# Patient Record
Sex: FEMALE | Race: WHITE | NOT HISPANIC OR LATINO | Employment: FULL TIME | URBAN - METROPOLITAN AREA
[De-identification: names, ages, dates, MRNs, and addresses within clinical notes are randomized per-mention and may not be internally consistent; named-entity substitution may affect disease eponyms.]

---

## 2019-05-16 PROBLEM — R87.612 LGSIL ON PAP SMEAR OF CERVIX: Status: ACTIVE | Noted: 2019-05-16

## 2019-05-16 PROBLEM — Z98.890 STATUS POST COLPOSCOPY: Status: ACTIVE | Noted: 2019-05-16

## 2019-05-20 LAB — SURGICAL PATHOLOGY: NORMAL

## 2019-05-22 PROBLEM — K31.7 GASTRIC POLYP: Status: ACTIVE | Noted: 2019-02-14

## 2019-06-03 PROBLEM — H53.8 BLURRED VISION, BILATERAL: Status: ACTIVE | Noted: 2019-06-03

## 2019-06-03 PROBLEM — E11.9 DIABETES MELLITUS WITHOUT COMPLICATION: Status: ACTIVE | Noted: 2019-06-03

## 2019-06-26 PROBLEM — R10.9 ABDOMINAL PAIN: Status: ACTIVE | Noted: 2019-06-26

## 2019-06-26 PROBLEM — E11.65 TYPE 2 DIABETES MELLITUS WITH HYPERGLYCEMIA, WITHOUT LONG-TERM CURRENT USE OF INSULIN: Chronic | Status: ACTIVE | Noted: 2019-06-26

## 2020-03-30 ENCOUNTER — OFFICE VISIT (OUTPATIENT)
Dept: SURGERY | Facility: CLINIC | Age: 30
End: 2020-03-30
Payer: MEDICAID

## 2020-03-30 VITALS — BODY MASS INDEX: 50.02 KG/M2 | HEIGHT: 64 IN | WEIGHT: 293 LBS

## 2020-03-30 DIAGNOSIS — K82.9 GALLBLADDER DISEASE: Primary | ICD-10-CM

## 2020-03-30 PROCEDURE — 99203 OFFICE O/P NEW LOW 30 MIN: CPT | Mod: S$GLB,,, | Performed by: SURGERY

## 2020-03-30 PROCEDURE — 99203 PR OFFICE/OUTPT VISIT, NEW, LEVL III, 30-44 MIN: ICD-10-PCS | Mod: S$GLB,,, | Performed by: SURGERY

## 2020-03-30 RX ORDER — HYDROCODONE BITARTRATE AND ACETAMINOPHEN 7.5; 325 MG/1; MG/1
TABLET ORAL
COMMUNITY
Start: 2020-03-27 | End: 2020-07-01 | Stop reason: SDUPTHER

## 2020-03-30 NOTE — PROGRESS NOTES
History & Physical    SUBJECTIVE:     History of Present Illness:    29-year-old female presents with right upper quadrant pain and right back pain over the past year with vomiting.  She apparently has seen a gastroenterologist and has had endoscopy and upper GI studies which I do not have results for.  She does have a history of GERD as well as gastritis.  She recently went to the emergency room at [facility] CT scan was done which showed a normal gallbladder and fatty liver.  Patient denies fever, chills, or pancreatitis however on her lab work her lipase was mildly elevated at 166.  Gallbladder ultrasound approximately a year and a half ago and she report which she says was normal.  HIDA scan was done which showed 91% ejection fraction which is an abnormal hyper contractility.    Chief Complaint   Patient presents with    Gall Bladder Problem         Review of patient's allergies indicates:  Review of patient's allergies indicates:   Allergen Reactions    Cherry flavor        Current Outpatient Medications on File Prior to Visit   Medication Sig Dispense Refill    ascorbic acid, vitamin C, (VITAMIN C) 100 MG tablet Take 100 mg by mouth once daily.      albuterol (VENTOLIN HFA) 90 mcg/actuation inhaler Inhale 2 puffs into the lungs every 6 (six) hours as needed for Wheezing. Rescue 18 g 11    atorvastatin (LIPITOR) 40 MG tablet Take 1 tablet (40 mg total) by mouth once daily. 90 tablet 3    cetirizine (ZYRTEC) 10 MG tablet Take 1 tablet (10 mg total) by mouth once daily. 30 tablet 1    cyclobenzaprine (FLEXERIL) 10 MG tablet Take 5 mg by mouth.       escitalopram oxalate (LEXAPRO) 10 MG tablet Take 1 tablet (10 mg total) by mouth once daily. 30 tablet 11    fluticasone propionate (FLONASE) 50 mcg/actuation nasal spray       HYDROcodone-acetaminophen (NORCO) 7.5-325 mg per tablet       lancets (TRUEPLUS LANCETS) 33 gauge Misc 1 lancet by Other route 2 (two) times daily. 180 each 3     levothyroxine (SYNTHROID) 100 MCG tablet Take 400 mcg by mouth.      levothyroxine (SYNTHROID) 300 MCG Tab       losartan-hydrochlorothiazide 100-25 mg (HYZAAR) 100-25 mg per tablet Take 1 tablet by mouth once daily. 90 tablet 3    metFORMIN (GLUCOPHAGE-XR) 500 MG 24 hr tablet Take 1 tablet (500 mg total) by mouth daily with breakfast. 90 tablet 3    montelukast (SINGULAIR) 10 mg tablet Take 10 mg by mouth.      ondansetron (ZOFRAN-ODT) 8 MG TbDL   0    pantoprazole (PROTONIX) 40 MG tablet Take 40 mg by mouth.      prochlorperazine (COMPAZINE) 5 MG tablet TK 1 TO 2 TS PO Q 6 TO 8 H PRN NV  0    [DISCONTINUED] blood-glucose meter kit Use as instructed 1 each 0    [DISCONTINUED] drospirenone-ethinyl estradiol (TRESA) 3-0.03 mg per tablet Take 1 tablet by mouth.      [DISCONTINUED] fenofibrate 160 MG Tab Take 160 mg by mouth.       No current facility-administered medications on file prior to visit.        Past Medical History:   Diagnosis Date    Amenorrhea     Diabetes mellitus     Gastritis     GERD with esophagitis     HLD (hyperlipidemia)     HTN, goal below 130/80     Hypothyroidism     NAFLD (nonalcoholic fatty liver disease)     PCOS (polycystic ovarian syndrome)     Prediabetes     Thyroid cancer      Past Surgical History:   Procedure Laterality Date    TONSILLECTOMY  2008    TOTAL THYROIDECTOMY  2011    URETERAL STENT PLACEMENT Left 04/2013    Removed 04/13     Family History   Problem Relation Age of Onset    Hypertension Mother     Stomach cancer Father     Esophageal cancer Father     Schizophrenia Father     Coronary artery disease Maternal Grandmother     Peripheral vascular disease Maternal Grandmother        Social History     Socioeconomic History    Marital status: Single     Spouse name: Not on file    Number of children: Not on file    Years of education: Not on file    Highest education level: Not on file   Occupational History    Not on file   Social Needs     Financial resource strain: Not on file    Food insecurity:     Worry: Not on file     Inability: Not on file    Transportation needs:     Medical: Not on file     Non-medical: Not on file   Tobacco Use    Smoking status: Never Smoker    Smokeless tobacco: Never Used   Substance and Sexual Activity    Alcohol use: Yes     Frequency: Monthly or less     Drinks per session: 1 or 2    Drug use: Yes     Types: Marijuana    Sexual activity: Yes     Partners: Male     Birth control/protection: OCP   Lifestyle    Physical activity:     Days per week: Not on file     Minutes per session: Not on file    Stress: Not on file   Relationships    Social connections:     Talks on phone: Not on file     Gets together: Not on file     Attends Mandaen service: Not on file     Active member of club or organization: Not on file     Attends meetings of clubs or organizations: Not on file     Relationship status: Not on file   Other Topics Concern    Not on file   Social History Narrative    Not on file          Review of Systems   Constitutional: Negative for chills and fever.   Respiratory: Positive for cough and sputum production.    Cardiovascular: Positive for chest pain.   Gastrointestinal: Negative for abdominal pain, heartburn, nausea and vomiting.   Genitourinary: Positive for dysuria and frequency.   Neurological: Positive for dizziness, seizures and weakness.   Endo/Heme/Allergies: Bruises/bleeds easily.       OBJECTIVE:     There were no vitals filed for this visit.              Physical Exam:  Physical Exam   Constitutional: She is oriented to person, place, and time and well-developed, well-nourished, and in no distress.   Morbidly obese   HENT:   Head: Normocephalic and atraumatic.   Eyes: Pupils are equal, round, and reactive to light. EOM are normal.   Neck: Normal range of motion. Neck supple.   Cardiovascular: Normal rate.   Pulmonary/Chest: Effort normal and breath sounds normal.   Abdominal: Soft. Bowel  sounds are normal. She exhibits no distension. There is no tenderness.   Musculoskeletal: Normal range of motion. She exhibits no edema.   Neurological: She is alert and oriented to person, place, and time. She has normal reflexes.   Skin: Skin is warm and dry.   Psychiatric: Affect and judgment normal.           ASSESSMENT/PLAN:   Right upper quadrant pain, possible gallbladder disease with hyper contractility  PLAN:  Repeat gallbladder ultrasound I think is indicated to see if she has gallstones.  I discussed laparoscopic cholecystectomy being done for hypocontractility disorders of the gallbladder and that this may or may not help her.  She wishes to proceed with surgery when elective cases can be scheduled

## 2020-04-28 DIAGNOSIS — K82.9 GALLBLADDER DISEASE: Primary | ICD-10-CM

## 2020-04-30 LAB
ALBUMIN SERPL BCP-MCNC: 3.7 G/DL (ref 3.4–5)
ALBUMIN/GLOBULIN RATIO: 0.95 RATIO
ALP SERPL-CCNC: 113 U/L (ref 45–117)
ALT SERPL W P-5'-P-CCNC: 66 U/L (ref 13–56)
AST SERPL-CCNC: 49 U/L (ref 15–37)
B-HCG SERPL-ACNC: < 1 MIU/ML
BILIRUB CONJ+UNCONJ SERPL-MCNC: 0.4 MG/DL
BILIRUB SERPL-MCNC: 0.5 MG/DL (ref 0.2–1)
BILIRUBIN DIRECT+TOT PNL SERPL-MCNC: 0.1 MG/DL (ref 0–0.2)
BUN SERPL-MCNC: 18 MG/DL (ref 7–18)
BUN/CREAT SERPL: 17.14 RATIO
CALCIUM BLD-MCNC: NORMAL MG/DL
CALCIUM SERPL-MCNC: 9 MG/DL (ref 8.5–10.1)
CHLORIDE SERPL-SCNC: 103 MMOL/L (ref 98–107)
CO2 SERPL-SCNC: 30 MMOL/L (ref 21–32)
COVID-19 AB, IGM: NORMAL
CREAT SERPL-MCNC: 1.05 MG/DL (ref 0.55–1.02)
ERYTHROCYTE [DISTWIDTH] IN BLOOD BY AUTOMATED COUNT: 14.6 % (ref 0–15.5)
GFR ESTIMATION: > 60
GLOBULIN: 3.9 G/DL
GLUCOSE SERPL-MCNC: 303 MG/DL (ref 74–106)
HCT VFR BLD AUTO: 40.7 % (ref 37–47)
HGB BLD-MCNC: 13.8 G/DL (ref 12–16)
MANUAL NRBC PER 100 CELLS: 0 % (ref 0–0.2)
MCH RBC QN AUTO: 30.9 PG (ref 27–32)
MCHC RBC AUTO-ENTMCNC: 33.9 % (ref 32–36)
MCV RBC AUTO: 91.1 FL (ref 80–99)
PLATELETS: 200 10*3/UL (ref 130–400)
POTASSIUM SERPL-SCNC: 3.9 MMOL/L (ref 3.5–5.1)
PROT SERPL-MCNC: 7.6 G/DL (ref 6.4–8.2)
RBC # BLD AUTO: 4.47 10*6/UL (ref 4.2–5.4)
SODIUM BLD-SCNC: 139 MMOL/L (ref 131–143)
WBC # BLD: 9.6 10*3/UL (ref 4.5–10)

## 2020-05-07 ENCOUNTER — OUTSIDE PLACE OF SERVICE (OUTPATIENT)
Dept: ADMINISTRATIVE | Facility: OTHER | Age: 30
End: 2020-05-07
Payer: MEDICAID

## 2020-05-07 LAB
GLUCOSE SERPL-MCNC: 178 MG/DL (ref 70–105)
GLUCOSE SERPL-MCNC: 208 MG/DL (ref 70–105)

## 2020-05-07 PROCEDURE — 47562 PR LAP,CHOLECYSTECTOMY: ICD-10-PCS | Mod: ,,, | Performed by: SURGERY

## 2020-05-07 PROCEDURE — 47562 LAPAROSCOPIC CHOLECYSTECTOMY: CPT | Mod: ,,, | Performed by: SURGERY

## 2020-05-13 ENCOUNTER — OFFICE VISIT (OUTPATIENT)
Dept: SURGERY | Facility: CLINIC | Age: 30
End: 2020-05-13
Payer: MEDICAID

## 2020-05-13 DIAGNOSIS — Z98.890 POST-OPERATIVE STATE: Primary | ICD-10-CM

## 2020-05-13 PROCEDURE — 99024 POSTOP FOLLOW-UP VISIT: CPT | Mod: S$GLB,,, | Performed by: SURGERY

## 2020-05-13 PROCEDURE — 99024 PR POST-OP FOLLOW-UP VISIT: ICD-10-PCS | Mod: S$GLB,,, | Performed by: SURGERY

## 2020-05-13 NOTE — PROGRESS NOTES
HPI:  Postop revisit status post laparoscopic cholecystectomy.  Patient complains of incisional soreness.  No nausea or vomiting.  Path report shows cholesterol [place] with chronic cholecystitis    PHYSICAL EXAM:  Incisions healing well.  Abdomen obese but soft with mild incisional tenderness.  Subcuticular suture removed from epigastric port site  ASSESSMENT:    Stable postop lap choly  PLAN:    Limitations discussed with patient.  Revisit as needed

## 2020-06-16 PROBLEM — R11.2 NON-INTRACTABLE VOMITING WITH NAUSEA: Status: ACTIVE | Noted: 2020-06-16

## 2020-06-16 PROBLEM — R80.9 POSITIVE FOR MACROALBUMINURIA: Status: ACTIVE | Noted: 2020-06-16

## 2020-06-16 PROBLEM — R29.818 SUSPECTED SLEEP APNEA: Status: ACTIVE | Noted: 2020-06-16

## 2020-06-16 PROBLEM — Z87.42 HISTORY OF OVARIAN CYST: Status: ACTIVE | Noted: 2020-06-16

## 2020-06-16 PROBLEM — Z87.42 HISTORY OF PCOS: Status: ACTIVE | Noted: 2020-06-16

## 2020-06-16 PROBLEM — R87.612 LOW GRADE SQUAMOUS INTRAEPITH LESION ON CYTOLOGIC SMEAR CERVIX (LGSIL): Status: ACTIVE | Noted: 2020-06-16

## 2020-07-02 PROBLEM — M62.830 BACK SPASM: Status: ACTIVE | Noted: 2020-07-02

## 2020-07-17 PROBLEM — R11.10 NON-INTRACTABLE VOMITING: Status: ACTIVE | Noted: 2018-08-22

## 2020-07-17 PROBLEM — R10.30 LOWER ABDOMINAL PAIN: Status: ACTIVE | Noted: 2018-12-20

## 2021-02-10 ENCOUNTER — HISTORICAL (OUTPATIENT)
Dept: ADMINISTRATIVE | Facility: HOSPITAL | Age: 31
End: 2021-02-10

## 2021-03-01 LAB
BILIRUB SERPL-MCNC: NEGATIVE MG/DL
BLOOD URINE, POC: NORMAL
CLARITY, POC UA: NORMAL
COLOR, POC UA: YELLOW
GLUCOSE UR QL STRIP: NORMAL
KETONES UR QL STRIP: NEGATIVE
LEUKOCYTE EST, POC UA: NORMAL
NITRITE, POC UA: NEGATIVE
PH, POC UA: 5.5
PROTEIN, POC: NORMAL
SARS-COV-2 RNA RESP QL NAA+PROBE: NEGATIVE
SPECIFIC GRAVITY, POC UA: 1.02
UROBILINOGEN, POC UA: NORMAL

## 2021-03-12 ENCOUNTER — HISTORICAL (OUTPATIENT)
Dept: ADMINISTRATIVE | Facility: HOSPITAL | Age: 31
End: 2021-03-12

## 2021-03-12 LAB — SARS-COV-2 RNA RESP QL NAA+PROBE: NOT DETECTED

## 2021-05-11 ENCOUNTER — HISTORICAL (OUTPATIENT)
Dept: ADMINISTRATIVE | Facility: HOSPITAL | Age: 31
End: 2021-05-11

## 2021-06-17 ENCOUNTER — HISTORICAL (OUTPATIENT)
Dept: ADMINISTRATIVE | Facility: HOSPITAL | Age: 31
End: 2021-06-17

## 2021-06-17 LAB — SARS-COV-2 RNA RESP QL NAA+PROBE: NEGATIVE

## 2021-06-19 LAB — FINAL CULTURE: NORMAL

## 2021-12-06 ENCOUNTER — PATIENT MESSAGE (OUTPATIENT)
Dept: RESEARCH | Facility: HOSPITAL | Age: 31
End: 2021-12-06
Payer: MEDICAID

## 2021-12-11 PROBLEM — Z79.4 TYPE 2 DIABETES MELLITUS WITH HYPERGLYCEMIA, WITH LONG-TERM CURRENT USE OF INSULIN: Status: ACTIVE | Noted: 2019-06-26

## 2021-12-11 PROBLEM — E11.65 TYPE 2 DIABETES MELLITUS WITH HYPERGLYCEMIA, WITH LONG-TERM CURRENT USE OF INSULIN: Status: ACTIVE | Noted: 2019-06-26

## 2022-04-11 ENCOUNTER — HISTORICAL (OUTPATIENT)
Dept: ADMINISTRATIVE | Facility: HOSPITAL | Age: 32
End: 2022-04-11
Payer: MEDICAID

## 2022-04-27 VITALS
DIASTOLIC BLOOD PRESSURE: 76 MMHG | HEIGHT: 64 IN | OXYGEN SATURATION: 96 % | SYSTOLIC BLOOD PRESSURE: 107 MMHG | WEIGHT: 292.31 LBS | BODY MASS INDEX: 49.9 KG/M2

## 2022-05-03 ENCOUNTER — OFFICE VISIT (OUTPATIENT)
Dept: ORTHOPEDICS | Facility: CLINIC | Age: 32
End: 2022-05-03
Payer: MEDICAID

## 2022-05-03 VITALS
TEMPERATURE: 98 F | HEART RATE: 86 BPM | SYSTOLIC BLOOD PRESSURE: 116 MMHG | DIASTOLIC BLOOD PRESSURE: 79 MMHG | HEIGHT: 64 IN | BODY MASS INDEX: 50.02 KG/M2 | WEIGHT: 293 LBS | RESPIRATION RATE: 18 BRPM

## 2022-05-03 DIAGNOSIS — G89.29 CHRONIC PAIN OF RIGHT ANKLE: Primary | ICD-10-CM

## 2022-05-03 DIAGNOSIS — S93.491D SPRAIN OF ANTERIOR TALOFIBULAR LIGAMENT OF RIGHT ANKLE, SUBSEQUENT ENCOUNTER: ICD-10-CM

## 2022-05-03 DIAGNOSIS — M25.571 CHRONIC PAIN OF RIGHT ANKLE: Primary | ICD-10-CM

## 2022-05-03 DIAGNOSIS — E66.01 CLASS 3 SEVERE OBESITY WITH BODY MASS INDEX (BMI) OF 50.0 TO 59.9 IN ADULT, UNSPECIFIED OBESITY TYPE, UNSPECIFIED WHETHER SERIOUS COMORBIDITY PRESENT: ICD-10-CM

## 2022-05-03 PROCEDURE — 4010F ACE/ARB THERAPY RXD/TAKEN: CPT | Mod: CPTII,,, | Performed by: STUDENT IN AN ORGANIZED HEALTH CARE EDUCATION/TRAINING PROGRAM

## 2022-05-03 PROCEDURE — 3074F PR MOST RECENT SYSTOLIC BLOOD PRESSURE < 130 MM HG: ICD-10-PCS | Mod: CPTII,,, | Performed by: STUDENT IN AN ORGANIZED HEALTH CARE EDUCATION/TRAINING PROGRAM

## 2022-05-03 PROCEDURE — 99214 OFFICE O/P EST MOD 30 MIN: CPT | Mod: S$PBB,,, | Performed by: STUDENT IN AN ORGANIZED HEALTH CARE EDUCATION/TRAINING PROGRAM

## 2022-05-03 PROCEDURE — 1159F PR MEDICATION LIST DOCUMENTED IN MEDICAL RECORD: ICD-10-PCS | Mod: CPTII,,, | Performed by: STUDENT IN AN ORGANIZED HEALTH CARE EDUCATION/TRAINING PROGRAM

## 2022-05-03 PROCEDURE — 1159F MED LIST DOCD IN RCRD: CPT | Mod: CPTII,,, | Performed by: STUDENT IN AN ORGANIZED HEALTH CARE EDUCATION/TRAINING PROGRAM

## 2022-05-03 PROCEDURE — 3008F PR BODY MASS INDEX (BMI) DOCUMENTED: ICD-10-PCS | Mod: CPTII,,, | Performed by: STUDENT IN AN ORGANIZED HEALTH CARE EDUCATION/TRAINING PROGRAM

## 2022-05-03 PROCEDURE — 3008F BODY MASS INDEX DOCD: CPT | Mod: CPTII,,, | Performed by: STUDENT IN AN ORGANIZED HEALTH CARE EDUCATION/TRAINING PROGRAM

## 2022-05-03 PROCEDURE — 3074F SYST BP LT 130 MM HG: CPT | Mod: CPTII,,, | Performed by: STUDENT IN AN ORGANIZED HEALTH CARE EDUCATION/TRAINING PROGRAM

## 2022-05-03 PROCEDURE — 4010F PR ACE/ARB THEARPY RXD/TAKEN: ICD-10-PCS | Mod: CPTII,,, | Performed by: STUDENT IN AN ORGANIZED HEALTH CARE EDUCATION/TRAINING PROGRAM

## 2022-05-03 PROCEDURE — 99214 PR OFFICE/OUTPT VISIT, EST, LEVL IV, 30-39 MIN: ICD-10-PCS | Mod: S$PBB,,, | Performed by: STUDENT IN AN ORGANIZED HEALTH CARE EDUCATION/TRAINING PROGRAM

## 2022-05-03 PROCEDURE — 99215 OFFICE O/P EST HI 40 MIN: CPT | Mod: PBBFAC | Performed by: STUDENT IN AN ORGANIZED HEALTH CARE EDUCATION/TRAINING PROGRAM

## 2022-05-03 PROCEDURE — 1160F RVW MEDS BY RX/DR IN RCRD: CPT | Mod: CPTII,,, | Performed by: STUDENT IN AN ORGANIZED HEALTH CARE EDUCATION/TRAINING PROGRAM

## 2022-05-03 PROCEDURE — 3078F DIAST BP <80 MM HG: CPT | Mod: CPTII,,, | Performed by: STUDENT IN AN ORGANIZED HEALTH CARE EDUCATION/TRAINING PROGRAM

## 2022-05-03 PROCEDURE — 1160F PR REVIEW ALL MEDS BY PRESCRIBER/CLIN PHARMACIST DOCUMENTED: ICD-10-PCS | Mod: CPTII,,, | Performed by: STUDENT IN AN ORGANIZED HEALTH CARE EDUCATION/TRAINING PROGRAM

## 2022-05-03 PROCEDURE — 3078F PR MOST RECENT DIASTOLIC BLOOD PRESSURE < 80 MM HG: ICD-10-PCS | Mod: CPTII,,, | Performed by: STUDENT IN AN ORGANIZED HEALTH CARE EDUCATION/TRAINING PROGRAM

## 2022-05-03 NOTE — PROGRESS NOTES
"      Subjective:      Patient ID: Danae Avendano is a 32 y.o. female Chief Complaint: Ankle Pain of the Right Ankle        This patient presents [facility] for follow up visit for rt ankle sprain after inversion injury in Oct 2020. Last here Oct 2021 visit note reviewed. She was managed by NSAIDs, ankle brace and referred to PT. Pt states she wasn't able to afford PT.     Today the pt states she tried to wear ankle brace provided at last visit but it hurt her foot. She states her ankle was improving at some point but it has been worsening the last 3 weeks with a burning pain on the tip and inside her ankle.       On 2/17/2021 she has MRI of rt ankle which was read to be significant for severe sprains of the ATFL, calcaneofibular ligament and deltoid ligament without full tear, minimal peroneal tenosynovitis, low-grade Achilles tendinosis and a small calcaneal enthesophyte as well as low grade focally chondral irregularity at the medial corner of the anterior tibial performed..    Onset: After inversion injury on 10/6/2020 after tripping down some stairs when her dog ran under her feet  Previously was seen in the ED multiple times for this issue twice in October and once in December for continued pain after her inversion injury in early October. Pt saw orthopedic surgeon in [place] who referred her to PT and gave her velcro brace.    Previous treatment: Norco's as prescribed by outside provider  Current pain level: 8/10 (rated as worsening )   Modifying factors: worse with activity;   Associated Symptoms: no numbness or tingling ; intermittent swelling ; no skin changes ;weakness ; mild decrease in ROM  Activity: sedentary, not full ADLs, pain interfering with ADLs; pain interferes with function/daily activity "moderately"     Of note, the patient is in the process of moving back to [place]. She is not interested in following up with the surgeon she saw before in [place].    Occupation: " working at [employer]:         Review of Systems   Constitutional: Negative.   Cardiovascular: Negative.    Respiratory: Negative.    Skin: Negative.    Neurological: Negative.          Objective:       General: well developed; well nourished; cooperative  PSYCH: alert and oriented with appropriate mood and affect  SKIN: inspection and palpation of skin and soft tissue normal;  CV: vascular integrity noted; +2 symmetrical pulses  Resp: Normal work of breathing, quiet breathing    Ankle MSK   Right ankle     Inspection:   Gait: Normal, FWB, swelling over lateral malleolus, no erythema, no bruising   Current foot ware: Sandals    Palpation: Diffusely tender over lateral, medial and anterior ankle    ROM   Plantarflexion (0-40): 40  Dorsiflexion (0-20):20  Pronation (0-20): 20  Supination (0-30): 30    Strength  Plantarflexion: 4/5  Dorsiflexion: 4/5  Inversion: 4/5  Eversion: 4/5    Neurovascular   pulses 2+ distally, sensation intact     Special tests   Goode: negative   Anterior drawer: negative   Talar tilt: negative   Tinel's: negative   Squeeze: negative   Lunge: negative     As per my interpretation of this patient's right ankle plain film. joint spaces are grossly preserved. There are no fractures or dislocations noted.        Assessment:       Encounter Diagnoses   Name Primary?    Chronic pain of right ankle Yes    Sprain of anterior talofibular ligament of right ankle, subsequent encounter     Class 3 severe obesity with body mass index (BMI) of 50.0 to 59.9 in adult, unspecified obesity type, unspecified whether serious comorbidity present          Plan:       Chronic pain of right ankle    Sprain of anterior talofibular ligament of right ankle, subsequent encounter    Class 3 severe obesity with body mass index (BMI) of 50.0 to 59.9 in adult, unspecified obesity type, unspecified whether serious comorbidity present    Other orders  -     Cancel: Large Joint Aspiration/Injection  -     Large Joint  Aspiration/Injection       32 Years old patient f/u in sports medicine clinic for evaluation of right ankle sprain, confirmed by MRI. Pt will continue conservative management to include NSAIDs bracing and HEP.    Moderate chronic exacerbation  Radiological studies ordered and interpreted by me, my independent interpretation attached, reviewed my findings with patient and showed them their images  Activity as tolerated, behavior modification encouraged.   Encouraged HEP daily, Ice/Heat prn, NSAIDs/Tylenol/topical anasthetics PRN, pt will continue with cautious use of OTC NSAIDS and tylenol bc of her comorbid conditions, topicals recommended, voltaren gel prescription may be continued, med precautions given,   Follow up PRN        BMI today: Body mass index is 52.15 kg/m².     Goal BMI: <40    Exercise prescription given to patient to increase fitness and facilitate weight loss. Prescription to include goals of 150 to 300 minutes/week of moderate intensity exercise include activities like brisk walking, light biking or water exercise and/or vigorous activity 75 to 150 minutes/week to include activities like jogging, swimming, fast bicycling or tennis. They were given goal of 7,500-10,000 steps per day. Muscle strength training was also discussed and recommended goal of 2 to 3 days a week to include activities like activities with elastic bands, body weight exercises or dumbbells.

## 2022-09-22 ENCOUNTER — HISTORICAL (OUTPATIENT)
Dept: ADMINISTRATIVE | Facility: HOSPITAL | Age: 32
End: 2022-09-22
Payer: MEDICAID

## 2022-09-28 ENCOUNTER — OFFICE VISIT (OUTPATIENT)
Dept: OBSTETRICS AND GYNECOLOGY | Facility: CLINIC | Age: 32
End: 2022-09-28
Payer: MEDICAID

## 2022-09-28 VITALS
HEART RATE: 97 BPM | WEIGHT: 293 LBS | BODY MASS INDEX: 50.02 KG/M2 | DIASTOLIC BLOOD PRESSURE: 99 MMHG | SYSTOLIC BLOOD PRESSURE: 145 MMHG | HEIGHT: 64 IN

## 2022-09-28 DIAGNOSIS — N83.201 CYST OF RIGHT OVARY: ICD-10-CM

## 2022-09-28 DIAGNOSIS — R10.2 PELVIC PAIN: Primary | ICD-10-CM

## 2022-09-28 PROCEDURE — 1159F PR MEDICATION LIST DOCUMENTED IN MEDICAL RECORD: ICD-10-PCS | Mod: CPTII,S$GLB,, | Performed by: OBSTETRICS & GYNECOLOGY

## 2022-09-28 PROCEDURE — 99203 OFFICE O/P NEW LOW 30 MIN: CPT | Mod: S$GLB,,, | Performed by: OBSTETRICS & GYNECOLOGY

## 2022-09-28 PROCEDURE — 3080F PR MOST RECENT DIASTOLIC BLOOD PRESSURE >= 90 MM HG: ICD-10-PCS | Mod: CPTII,S$GLB,, | Performed by: OBSTETRICS & GYNECOLOGY

## 2022-09-28 PROCEDURE — 1159F MED LIST DOCD IN RCRD: CPT | Mod: CPTII,S$GLB,, | Performed by: OBSTETRICS & GYNECOLOGY

## 2022-09-28 PROCEDURE — 3077F SYST BP >= 140 MM HG: CPT | Mod: CPTII,S$GLB,, | Performed by: OBSTETRICS & GYNECOLOGY

## 2022-09-28 PROCEDURE — 4010F PR ACE/ARB THEARPY RXD/TAKEN: ICD-10-PCS | Mod: CPTII,S$GLB,, | Performed by: OBSTETRICS & GYNECOLOGY

## 2022-09-28 PROCEDURE — 4010F ACE/ARB THERAPY RXD/TAKEN: CPT | Mod: CPTII,S$GLB,, | Performed by: OBSTETRICS & GYNECOLOGY

## 2022-09-28 PROCEDURE — 99203 PR OFFICE/OUTPT VISIT, NEW, LEVL III, 30-44 MIN: ICD-10-PCS | Mod: S$GLB,,, | Performed by: OBSTETRICS & GYNECOLOGY

## 2022-09-28 PROCEDURE — 3008F PR BODY MASS INDEX (BMI) DOCUMENTED: ICD-10-PCS | Mod: CPTII,S$GLB,, | Performed by: OBSTETRICS & GYNECOLOGY

## 2022-09-28 PROCEDURE — 3077F PR MOST RECENT SYSTOLIC BLOOD PRESSURE >= 140 MM HG: ICD-10-PCS | Mod: CPTII,S$GLB,, | Performed by: OBSTETRICS & GYNECOLOGY

## 2022-09-28 PROCEDURE — 3008F BODY MASS INDEX DOCD: CPT | Mod: CPTII,S$GLB,, | Performed by: OBSTETRICS & GYNECOLOGY

## 2022-09-28 PROCEDURE — 3080F DIAST BP >= 90 MM HG: CPT | Mod: CPTII,S$GLB,, | Performed by: OBSTETRICS & GYNECOLOGY

## 2022-09-28 RX ORDER — MEDROXYPROGESTERONE ACETATE 10 MG/1
10 TABLET ORAL DAILY
Qty: 12 TABLET | Refills: 4 | Status: SHIPPED | OUTPATIENT
Start: 2022-09-28 | End: 2023-04-05

## 2022-09-28 NOTE — PROGRESS NOTES
Subjective:       Patient ID: Danae Avendano is a 32 y.o. female.    Chief Complaint: Pelvic Pain    Pelvic Pain  The patient's primary symptoms include pelvic pain. This is a chronic problem. The current episode started more than 1 year ago. The problem occurs constantly. The problem has been rapidly worsening. The pain is severe. The problem affects the right side. She is not pregnant. Associated symptoms include abdominal pain, back pain and nausea. Pertinent negatives include no anorexia, chills, constipation, diarrhea, discolored urine, dysuria, fever, flank pain, frequency, headaches, hematuria, painful intercourse, rash, urgency or vomiting. The symptoms are aggravated by nothing and tactile pressure. Nothing and tactile pressure aggravates the symptoms. She has tried antibiotics, heating pad, NSAIDs, oral narcotics and warm bath for the symptoms. The treatment provided no relief. She is sexually active. No, her partner does not have an STD. She uses nothing for contraception. Her menstrual history has been irregular. Her past medical history is significant for menorrhagia and ovarian cysts. There is no history of an abdominal surgery, a  section, an ectopic pregnancy, endometriosis, a gynecological surgery, herpes simplex, metrorrhagia, miscarriage, perineal abscess, PID, an STD, a terminated pregnancy or vaginosis.   Review of Systems   Constitutional:  Negative for chills and fever.   Gastrointestinal:  Positive for abdominal pain and nausea. Negative for anorexia, constipation, diarrhea and vomiting.   Genitourinary:  Positive for menorrhagia and pelvic pain. Negative for dysuria, flank pain, frequency, hematuria and urgency.   Musculoskeletal:  Positive for back pain.   Integumentary:  Negative for rash.   Neurological:  Negative for headaches.       Objective:      Physical Exam    Assessment:       Problem List Items Addressed This Visit    None  Visit Diagnoses       Pelvic pain    -  Primary     Cyst of right ovary                  Plan:         Provera 10 milligrams for 12 days

## 2022-09-28 NOTE — PROGRESS NOTES
This 32-year-old female who went to the emergency room Has been having pain has been having pain for over a year and right lower quadrant by history she has a 4 centimeter cyst.  She has a history of polycystic ovaries irregular periods every other period is slightly heavy she took birth control pills but slightly nauseated the whole time she is a diabetic the blood sugars in the 230 range.  She had been evaluated for possible gastric sleeve but needs to get her A1c under control.  On exam she has some deep tenderness in the right lower quadrant no rebound pelvic examination reveals no lesion Paps smear was done a week ago in [facility].  .  GC chlamydia were done no palpable masses or no' stream tenderness in the right lower quadrant had a long discussion whether told her that the she leaks be extremely high risk for any kind of laparoscopic surgery.  She states she was told her oxygen sat saturation decreased is with gallbladder surgery.  I do not think she could tolerate the extreme Trendelenburg position for laparoscopy therefore we will try her on Provera for 12 days and then consider or lifts or something in that range.Answers submitted by the patient for this visit:  Pelvic Pain Questionnaire (Submitted on 9/26/2022)  Chief Complaint: Pelvic pain  Chronicity: chronic  Onset: more than 1 year ago  Frequency: constantly  Progression since onset: rapidly worsening  Pain severity: severe  Affected side: right  Pregnant now?: No  abdominal pain: Yes  anorexia: No  back pain: Yes  chills: No  constipation: No  diarrhea: No  discolored urine: No  dysuria: No  fever: No  flank pain: No  frequency: No  headaches: No  hematuria: No  nausea: Yes  painful intercourse: No  rash: No  urgency: No  vomiting: No  Aggravated by: nothing, tactile pressure  treatments tried: antibiotics, heating pad, NSAIDs, oral narcotics, warm bath  Improvement on treatment: no relief  Sexual activity: sexually active  Partner with STD  symptoms: no  Birth control: nothing  Menstrual history: irregular  STD: No  abdominal surgery: No   section: No  Ectopic pregnancy: No  Endometriosis: No  herpes simplex: No  gynecological surgery: No  menorrhagia: Yes  metrorrhagia: No  miscarriage: No  ovarian cysts: Yes  perineal abscess: No  PID: No  terminated pregnancy: No  vaginosis: No

## 2022-11-18 ENCOUNTER — PATIENT OUTREACH (OUTPATIENT)
Dept: ADMINISTRATIVE | Facility: HOSPITAL | Age: 32
End: 2022-11-18
Payer: MEDICAID

## 2022-11-18 ENCOUNTER — PATIENT MESSAGE (OUTPATIENT)
Dept: ADMINISTRATIVE | Facility: HOSPITAL | Age: 32
End: 2022-11-18
Payer: MEDICAID

## 2022-12-13 ENCOUNTER — OFFICE VISIT (OUTPATIENT)
Dept: OBSTETRICS AND GYNECOLOGY | Facility: CLINIC | Age: 32
End: 2022-12-13
Payer: MEDICAID

## 2022-12-13 VITALS
WEIGHT: 293 LBS | DIASTOLIC BLOOD PRESSURE: 91 MMHG | BODY MASS INDEX: 51.67 KG/M2 | SYSTOLIC BLOOD PRESSURE: 131 MMHG | HEART RATE: 97 BPM

## 2022-12-13 DIAGNOSIS — N83.201 CYST OF RIGHT OVARY: Primary | ICD-10-CM

## 2022-12-13 PROCEDURE — 4010F PR ACE/ARB THEARPY RXD/TAKEN: ICD-10-PCS | Mod: CPTII,S$GLB,, | Performed by: OBSTETRICS & GYNECOLOGY

## 2022-12-13 PROCEDURE — 3008F PR BODY MASS INDEX (BMI) DOCUMENTED: ICD-10-PCS | Mod: CPTII,S$GLB,, | Performed by: OBSTETRICS & GYNECOLOGY

## 2022-12-13 PROCEDURE — 99213 OFFICE O/P EST LOW 20 MIN: CPT | Mod: S$GLB,,, | Performed by: OBSTETRICS & GYNECOLOGY

## 2022-12-13 PROCEDURE — 1159F PR MEDICATION LIST DOCUMENTED IN MEDICAL RECORD: ICD-10-PCS | Mod: CPTII,S$GLB,, | Performed by: OBSTETRICS & GYNECOLOGY

## 2022-12-13 PROCEDURE — 3008F BODY MASS INDEX DOCD: CPT | Mod: CPTII,S$GLB,, | Performed by: OBSTETRICS & GYNECOLOGY

## 2022-12-13 PROCEDURE — 3075F SYST BP GE 130 - 139MM HG: CPT | Mod: CPTII,S$GLB,, | Performed by: OBSTETRICS & GYNECOLOGY

## 2022-12-13 PROCEDURE — 3080F DIAST BP >= 90 MM HG: CPT | Mod: CPTII,S$GLB,, | Performed by: OBSTETRICS & GYNECOLOGY

## 2022-12-13 PROCEDURE — 99213 PR OFFICE/OUTPT VISIT, EST, LEVL III, 20-29 MIN: ICD-10-PCS | Mod: S$GLB,,, | Performed by: OBSTETRICS & GYNECOLOGY

## 2022-12-13 PROCEDURE — 3075F PR MOST RECENT SYSTOLIC BLOOD PRESS GE 130-139MM HG: ICD-10-PCS | Mod: CPTII,S$GLB,, | Performed by: OBSTETRICS & GYNECOLOGY

## 2022-12-13 PROCEDURE — 4010F ACE/ARB THERAPY RXD/TAKEN: CPT | Mod: CPTII,S$GLB,, | Performed by: OBSTETRICS & GYNECOLOGY

## 2022-12-13 PROCEDURE — 3080F PR MOST RECENT DIASTOLIC BLOOD PRESSURE >= 90 MM HG: ICD-10-PCS | Mod: CPTII,S$GLB,, | Performed by: OBSTETRICS & GYNECOLOGY

## 2022-12-13 PROCEDURE — 1159F MED LIST DOCD IN RCRD: CPT | Mod: CPTII,S$GLB,, | Performed by: OBSTETRICS & GYNECOLOGY

## 2022-12-13 NOTE — PROGRESS NOTES
32-year-old female had a right ovarian cyst on CT scan pelvic exam I do not feel any cyst however the patient is fairly large therefore repeat the ultrasound will will go from thereAnswers submitted by the patient for this visit:  Gynecologic Exam Questionnaire  (Submitted on 2022)  Chief Complaint: Gynecologic exam  genital itching: No  genital lesions: No  genital odor: No  genital rash: No  missed menses: No  pelvic pain: Yes  vaginal bleeding: No  vaginal discharge: No  Chronicity: chronic  Onset: more than 1 year ago  Frequency: constantly  Progression since onset: waxing and waning  Pain severity: severe  Affected side: right  Pregnant now?: No  abdominal pain: Yes  anorexia: No  back pain: Yes  chills: Yes  constipation: No  diarrhea: No  discolored urine: No  dysuria: No  fever: No  flank pain: Yes  frequency: Yes  headaches: Yes  hematuria: No  nausea: Yes  painful intercourse: No  rash: No  urgency: Yes  vomiting: No  Please select the characteristics of your discharge: : normal  Vaginal bleeding: no bleeding  Passing clots?: Yes  Passing tissue?: No  Aggravated by: activity, heavy lifting, tactile pressure  treatments tried: antibiotics, heating pad, NSAIDs, warm bath  Improvement on treatment: no relief  Sexual activity: sexually active  Partner with STD symptoms: no  Birth control: nothing  Menstrual history: irregular  STD: No  abdominal surgery: No   section: No  Ectopic pregnancy: No  Endometriosis: No  herpes simplex: No  gynecological surgery: No  menorrhagia: Yes  metrorrhagia: No  miscarriage: No  ovarian cysts: Yes  perineal abscess: No  PID: No  terminated pregnancy: No  vaginosis: No

## 2022-12-30 DIAGNOSIS — N83.201 CYST OF RIGHT OVARY: Primary | ICD-10-CM

## 2022-12-30 DIAGNOSIS — R10.2 PELVIC PAIN: ICD-10-CM

## 2023-01-03 ENCOUNTER — PROCEDURE VISIT (OUTPATIENT)
Dept: OBSTETRICS AND GYNECOLOGY | Facility: CLINIC | Age: 33
End: 2023-01-03
Payer: MEDICAID

## 2023-01-03 ENCOUNTER — OFFICE VISIT (OUTPATIENT)
Dept: OBSTETRICS AND GYNECOLOGY | Facility: CLINIC | Age: 33
End: 2023-01-03
Payer: MEDICAID

## 2023-01-03 VITALS
HEART RATE: 91 BPM | BODY MASS INDEX: 52.18 KG/M2 | WEIGHT: 293 LBS | DIASTOLIC BLOOD PRESSURE: 90 MMHG | SYSTOLIC BLOOD PRESSURE: 140 MMHG

## 2023-01-03 DIAGNOSIS — N83.201 CYST OF RIGHT OVARY: ICD-10-CM

## 2023-01-03 DIAGNOSIS — N83.201 CYST OF RIGHT OVARY: Primary | ICD-10-CM

## 2023-01-03 DIAGNOSIS — R10.2 PELVIC PAIN: ICD-10-CM

## 2023-01-03 PROCEDURE — 76856 US OB/GYN PROCEDURE (VIEWPOINT): ICD-10-PCS | Mod: S$GLB,,, | Performed by: OBSTETRICS & GYNECOLOGY

## 2023-01-03 PROCEDURE — 3080F PR MOST RECENT DIASTOLIC BLOOD PRESSURE >= 90 MM HG: ICD-10-PCS | Mod: CPTII,S$GLB,, | Performed by: OBSTETRICS & GYNECOLOGY

## 2023-01-03 PROCEDURE — 1159F MED LIST DOCD IN RCRD: CPT | Mod: CPTII,S$GLB,, | Performed by: OBSTETRICS & GYNECOLOGY

## 2023-01-03 PROCEDURE — 76856 US EXAM PELVIC COMPLETE: CPT | Mod: S$GLB,,, | Performed by: OBSTETRICS & GYNECOLOGY

## 2023-01-03 PROCEDURE — 1159F PR MEDICATION LIST DOCUMENTED IN MEDICAL RECORD: ICD-10-PCS | Mod: CPTII,S$GLB,, | Performed by: OBSTETRICS & GYNECOLOGY

## 2023-01-03 PROCEDURE — 99213 PR OFFICE/OUTPT VISIT, EST, LEVL III, 20-29 MIN: ICD-10-PCS | Mod: 25,S$GLB,, | Performed by: OBSTETRICS & GYNECOLOGY

## 2023-01-03 PROCEDURE — 3077F SYST BP >= 140 MM HG: CPT | Mod: CPTII,S$GLB,, | Performed by: OBSTETRICS & GYNECOLOGY

## 2023-01-03 PROCEDURE — 99213 OFFICE O/P EST LOW 20 MIN: CPT | Mod: 25,S$GLB,, | Performed by: OBSTETRICS & GYNECOLOGY

## 2023-01-03 PROCEDURE — 3077F PR MOST RECENT SYSTOLIC BLOOD PRESSURE >= 140 MM HG: ICD-10-PCS | Mod: CPTII,S$GLB,, | Performed by: OBSTETRICS & GYNECOLOGY

## 2023-01-03 PROCEDURE — 3008F BODY MASS INDEX DOCD: CPT | Mod: CPTII,S$GLB,, | Performed by: OBSTETRICS & GYNECOLOGY

## 2023-01-03 PROCEDURE — 3080F DIAST BP >= 90 MM HG: CPT | Mod: CPTII,S$GLB,, | Performed by: OBSTETRICS & GYNECOLOGY

## 2023-01-03 PROCEDURE — 3008F PR BODY MASS INDEX (BMI) DOCUMENTED: ICD-10-PCS | Mod: CPTII,S$GLB,, | Performed by: OBSTETRICS & GYNECOLOGY

## 2023-01-03 NOTE — PROGRESS NOTES
Subjective:       Patient ID: Danae Avendano is a 32 y.o. female.    Chief Complaint: Follow-up    Follow-up  Associated symptoms include abdominal pain and headaches. Pertinent negatives include no anorexia, arthralgias, chest pain, chills, congestion, fever, joint swelling, myalgias, nausea, neck pain, rash or vomiting.   Gynecologic Exam  The patient's primary symptoms include pelvic pain. The patient's pertinent negatives include no genital itching, genital lesions, genital odor, genital rash, missed menses, vaginal bleeding or vaginal discharge. This is a chronic problem. The current episode started more than 1 year ago. The problem occurs constantly. The problem has been gradually worsening. The pain is severe. The problem affects the right side. She is not pregnant. Associated symptoms include abdominal pain, back pain, flank pain, frequency, headaches and urgency. Pertinent negatives include no anorexia, chills, constipation, diarrhea, discolored urine, dysuria, fever, hematuria, nausea, painful intercourse, rash or vomiting. There has been no bleeding. She has been passing clots. She has not been passing tissue. The symptoms are aggravated by heavy lifting and tactile pressure. She has tried acetaminophen, antibiotics, heating pad and warm bath for the symptoms. The treatment provided no relief. She is sexually active. No, her partner does not have an STD. She uses nothing for contraception. Her menstrual history has been irregular. Her past medical history is significant for menorrhagia and ovarian cysts. There is no history of an abdominal surgery, a  section, an ectopic pregnancy, endometriosis, a gynecological surgery, herpes simplex, metrorrhagia, miscarriage, perineal abscess, PID, an STD, a terminated pregnancy or vaginosis.   Review of Systems   Constitutional:  Negative for activity change, appetite change, chills, fever and unexpected weight change.   HENT:  Negative for nasal congestion,  dental problem, facial swelling, hearing loss and mouth sores.    Respiratory:  Negative for apnea, chest tightness, shortness of breath and wheezing.    Cardiovascular:  Negative for chest pain and leg swelling.   Gastrointestinal:  Positive for abdominal pain. Negative for abdominal distention, anal bleeding, anorexia, blood in stool, constipation, diarrhea, nausea, rectal pain and vomiting.   Genitourinary:  Positive for flank pain, frequency, menorrhagia, pelvic pain and urgency. Negative for decreased urine volume, difficulty urinating, dyspareunia, dysuria, enuresis, genital sores, hematuria, menstrual problem, missed menses, vaginal bleeding, vaginal discharge and vaginal pain.   Musculoskeletal:  Positive for back pain. Negative for arthralgias, joint swelling, myalgias and neck pain.   Integumentary:  Negative for color change, pallor, rash and wound.   Allergic/Immunologic: Negative for immunocompromised state.   Neurological:  Positive for headaches. Negative for dizziness and light-headedness.   Hematological:  Negative for adenopathy. Does not bruise/bleed easily.   Psychiatric/Behavioral:  Negative for agitation, behavioral problems, confusion, sleep disturbance and suicidal ideas. The patient is not nervous/anxious and is not hyperactive.        Objective:      Physical Exam    Assessment:       Problem List Items Addressed This Visit    None      Plan:

## 2023-01-03 NOTE — PROGRESS NOTES
32-year-old female here with a history of a right ovarian cyst.  She complains of mild pain often on previously the cyst was 4 x 4 x 3.8 now it is 5 x 4 x 4 which is essentially the same plane to her that with her weight she would be high risk for surgery said she had a lot of problems with her oxygen saturation drop in with her gallbladder surgery.  She can not tolerate birth control pills therefore she was and had not changed I just think will watch her she is also having appointment with the urologist for bladder problems

## 2023-01-11 DIAGNOSIS — E11.9 TYPE 2 DIABETES MELLITUS WITHOUT COMPLICATION: ICD-10-CM

## 2023-02-08 DIAGNOSIS — I51.7 SEVERE LEFT VENTRICULAR HYPERTROPHY: Primary | ICD-10-CM

## 2023-04-05 ENCOUNTER — OFFICE VISIT (OUTPATIENT)
Dept: OBSTETRICS AND GYNECOLOGY | Facility: CLINIC | Age: 33
End: 2023-04-05
Payer: MEDICAID

## 2023-04-05 VITALS
BODY MASS INDEX: 51.49 KG/M2 | SYSTOLIC BLOOD PRESSURE: 130 MMHG | HEART RATE: 88 BPM | DIASTOLIC BLOOD PRESSURE: 87 MMHG | WEIGHT: 293 LBS

## 2023-04-05 DIAGNOSIS — N83.202 CYST OF LEFT OVARY: Primary | ICD-10-CM

## 2023-04-05 DIAGNOSIS — N83.201 CYST OF RIGHT OVARY: Primary | ICD-10-CM

## 2023-04-05 PROCEDURE — 3008F PR BODY MASS INDEX (BMI) DOCUMENTED: ICD-10-PCS | Mod: CPTII,S$GLB,, | Performed by: OBSTETRICS & GYNECOLOGY

## 2023-04-05 PROCEDURE — 99213 PR OFFICE/OUTPT VISIT, EST, LEVL III, 20-29 MIN: ICD-10-PCS | Mod: S$GLB,,, | Performed by: OBSTETRICS & GYNECOLOGY

## 2023-04-05 PROCEDURE — 3075F PR MOST RECENT SYSTOLIC BLOOD PRESS GE 130-139MM HG: ICD-10-PCS | Mod: CPTII,S$GLB,, | Performed by: OBSTETRICS & GYNECOLOGY

## 2023-04-05 PROCEDURE — 3075F SYST BP GE 130 - 139MM HG: CPT | Mod: CPTII,S$GLB,, | Performed by: OBSTETRICS & GYNECOLOGY

## 2023-04-05 PROCEDURE — 99213 OFFICE O/P EST LOW 20 MIN: CPT | Mod: S$GLB,,, | Performed by: OBSTETRICS & GYNECOLOGY

## 2023-04-05 PROCEDURE — 3008F BODY MASS INDEX DOCD: CPT | Mod: CPTII,S$GLB,, | Performed by: OBSTETRICS & GYNECOLOGY

## 2023-04-05 PROCEDURE — 3079F DIAST BP 80-89 MM HG: CPT | Mod: CPTII,S$GLB,, | Performed by: OBSTETRICS & GYNECOLOGY

## 2023-04-05 PROCEDURE — 3079F PR MOST RECENT DIASTOLIC BLOOD PRESSURE 80-89 MM HG: ICD-10-PCS | Mod: CPTII,S$GLB,, | Performed by: OBSTETRICS & GYNECOLOGY

## 2023-04-05 RX ORDER — MEDROXYPROGESTERONE ACETATE 10 MG/1
10 TABLET ORAL DAILY
Qty: 10 TABLET | Refills: 0 | Status: SHIPPED | OUTPATIENT
Start: 2023-04-05 | End: 2024-04-04

## 2023-04-05 RX ORDER — FENOFIBRATE 145 MG/1
145 TABLET, FILM COATED ORAL
COMMUNITY
Start: 2023-01-30

## 2023-04-05 NOTE — PROGRESS NOTES
Is a 32-year-old female who said she went to the emergency room was having a pain said they told her was a benign-appearing cyst on left ovary had decreased in size from 5.5 to around 5 cm said is feeling better today she has dropped some weight still right at 300 pelvic examination was normal she had a.  In a month so we then put her on some Provera might help shrink the cyst and bring her.  Down just started on multiple multiple regimens including fish all vitamins etcetera outbreak lose weight and control her cholesterol..  Urine pregnancy test was negative today

## 2023-05-03 ENCOUNTER — PATIENT OUTREACH (OUTPATIENT)
Dept: ADMINISTRATIVE | Facility: HOSPITAL | Age: 33
End: 2023-05-03
Payer: MEDICAID